# Patient Record
Sex: FEMALE | URBAN - METROPOLITAN AREA
[De-identification: names, ages, dates, MRNs, and addresses within clinical notes are randomized per-mention and may not be internally consistent; named-entity substitution may affect disease eponyms.]

---

## 2019-02-14 ENCOUNTER — HOSPITAL ENCOUNTER (OUTPATIENT)
Dept: NUTRITION | Facility: HOSPITAL | Age: 69
End: 2019-02-16
Attending: INTERNAL MEDICINE | Admitting: INTERNAL MEDICINE

## 2019-02-14 LAB
ABS NEUT (OLG): 6.69 X10(3)/MCL (ref 2.1–9.2)
ALBUMIN SERPL-MCNC: 4.2 GM/DL (ref 3.4–5)
ALBUMIN/GLOB SERPL: 1.2 {RATIO}
ALP SERPL-CCNC: 93 UNIT/L (ref 38–126)
ALT SERPL-CCNC: 18 UNIT/L (ref 12–78)
AST SERPL-CCNC: 16 UNIT/L (ref 15–37)
BASOPHILS # BLD AUTO: 0 X10(3)/MCL (ref 0–0.2)
BASOPHILS NFR BLD AUTO: 1 %
BILIRUB SERPL-MCNC: 1 MG/DL (ref 0.2–1)
BILIRUBIN DIRECT+TOT PNL SERPL-MCNC: 0.1 MG/DL (ref 0–0.2)
BILIRUBIN DIRECT+TOT PNL SERPL-MCNC: 0.9 MG/DL (ref 0–0.8)
BUN SERPL-MCNC: 10 MG/DL (ref 7–18)
CALCIUM SERPL-MCNC: 9.1 MG/DL (ref 8.5–10.1)
CHLORIDE SERPL-SCNC: 103 MMOL/L (ref 98–107)
CO2 SERPL-SCNC: 27 MMOL/L (ref 21–32)
CREAT SERPL-MCNC: 0.88 MG/DL (ref 0.55–1.02)
EOSINOPHIL # BLD AUTO: 0.4 X10(3)/MCL (ref 0–0.9)
EOSINOPHIL NFR BLD AUTO: 5 %
ERYTHROCYTE [DISTWIDTH] IN BLOOD BY AUTOMATED COUNT: 12.8 % (ref 11.5–17)
GLOBULIN SER-MCNC: 3.5 GM/DL (ref 2.4–3.5)
GLUCOSE SERPL-MCNC: 199 MG/DL (ref 74–106)
HCO3 UR-SCNC: 23.5 MMOL/L (ref 22–26)
HCT VFR BLD AUTO: 44.1 % (ref 37–47)
HGB BLD-MCNC: 14.4 GM/DL (ref 12–16)
LYMPHOCYTES # BLD AUTO: 1.4 X10(3)/MCL (ref 0.6–4.6)
LYMPHOCYTES NFR BLD AUTO: 16 %
MCH RBC QN AUTO: 31.9 PG (ref 27–31)
MCHC RBC AUTO-ENTMCNC: 32.7 GM/DL (ref 33–36)
MCV RBC AUTO: 97.6 FL (ref 80–94)
MONOCYTES # BLD AUTO: 0.3 X10(3)/MCL (ref 0.1–1.3)
MONOCYTES NFR BLD AUTO: 3 %
NEUTROPHILS # BLD AUTO: 6.69 X10(3)/MCL (ref 2.1–9.2)
NEUTROPHILS NFR BLD AUTO: 75 %
O2 HGB ARTERIAL: 91 % (ref 94–97)
PCO2 BLDA: 37 MMHG (ref 35–45)
PH SMN: 7.41 [PH] (ref 7.35–7.45)
PLATELET # BLD AUTO: 250 X10(3)/MCL (ref 130–400)
PMV BLD AUTO: 8.6 FL (ref 9.4–12.4)
PO2 BLDA: 58 MMHG (ref 80–100)
POC ALLENS TEST: ABNORMAL
POC BE: -0.8 (ref -2–3)
POC CAO2: 19 ML/DL (ref 15.7–21.6)
POC CO HGB: 1.8 %
POC CO2: 24.6 MMOL/L (ref 22–27)
POC IONIZED CALCIUM: 1.15 MMOL/L (ref 1.12–1.23)
POC MET HGB: 1.2 % (ref 0.4–1.5)
POC SAMPLESOURCE: ABNORMAL
POC SATURATED O2: 90 % (ref 96–97)
POC SITE: ABNORMAL
POC THB: 14.9 GM/DL (ref 12–16)
POC TREATMENT: ABNORMAL
POTASSIUM BLD-SCNC: 2.7 MMOL/L (ref 3.6–5)
POTASSIUM SERPL-SCNC: 3.6 MMOL/L (ref 3.5–5.1)
PROT SERPL-MCNC: 7.7 GM/DL (ref 6.4–8.2)
RBC # BLD AUTO: 4.52 X10(6)/MCL (ref 4.2–5.4)
SODIUM BLD-SCNC: 139 MMOL/L (ref 137–145)
SODIUM SERPL-SCNC: 141 MMOL/L (ref 136–145)
WBC # SPEC AUTO: 8.9 X10(3)/MCL (ref 4.5–11.5)

## 2022-04-30 NOTE — DISCHARGE SUMMARY
Patient:   Marilyn Carpenter            MRN: [id number]            FIN: [id number]               Age:   69 years     Sex:  Female     :  1950   Associated Diagnoses:   None   Author:   Tania Burch MD      Discharge Information      Discharge Summary Information   Admit/Discharge Dates   Admit Date: 2019  Discharge Date: 2019   Physicians   Attending Physician - Conrad LEES, Efrain DUMAS  Admitting Physician - Conrad LEES, Efrain DUMAS  Primary Care Physician - Ken[facility]ONEY, [facility]   Discharge Diagnosis   Acute bronchitis  Dyspnea, unspecified (R06.00)    Procedures   No procedures recorded for this visit.   Immunizations   No immunizations recorded for this visit.   Discharge Medications   Prescribed  albuterol-ipratropium (DuoNeb 0.5 mg-2.5 mg/3 mL inhalation solution) 3 mL, NEB, q4hr Resp, PRN shortness of breath or wheezing  pravastatin (Pravachol 20 mg oral tablet) 20 mg, Oral, Daily  predniSONE (prednisONE 20 mg oral tablet) See Instructions  Continue  levothyroxine (LEVOTHYROXIN TAB 50MCG) 50 mcg, Oral, Daily  Discontinue  albuterol (VENTOLIN HFA AER) 2 puff(s), INH, q4hr, PRN wheezing  azithromycin (AZITHROMYCIN TAB 250MG), Oral, As Directed  meloxicam (MELOXICAM    TAB 7.5MG) 7.5 mg, Oral, Daily  pravastatin (PRAVASTATIN  TAB 20MG) 1 tablet, Oral, Every other day  promethazine (PROMETHAZINE SYP 6.25/5ML)      Education   Discharge - 19 8:13:00 CST, Home       Followup   Anytime the conditions worsen, return to clinic or go to ED  Carrie De NP, within 1 week      Hospital Course   69 yrold WF who presented to the ED with c/o SOB. She developed wheezing about a month ago which has persisted. For the last 3 days she's had progressively worsening SOB. Admits to an occasional cough, clear sputum, but nothing significant. Denies fever or chills. Saw her PCP yesterday and was prescribed zithromax. Chest xray shows nothing acute. ABG showed pH 58. Glucose 199 but otherwise labs  unremarkable. No prior history of COPD or DM. Meds given in ED include Zithromax, solumedrol, Magnesium sulfate and bronchodilators. Feels much better at this time. She quit smoking in 2015 but her  still smokes in the house.  Patient was started on IV Solu-Medrol, Zithromax and DuoNeb's she was feeling much better gradually be weaned her off the steroids.  She was afebrile and symptomatically feeling much better so she was discharged home on prednisone taper.  Patient already had the Zithromax [place] at home and she was advised to continue it for 1 more day.  Patient will follow-up with her primary care physician and she was instructed to follow-up with pulmonologist outpatient.         Physical Examination      Vital Signs (last 24 hrs)_____  Last Charted___________  Temp Oral     36.5 DegC  (FEB 16 07:00)  Heart Rate Peripheral   85 bpm  (FEB 16 07:00)  Resp Rate         20 br/min  (FEB 15 23:33)  SBP      H 143mmHg  (FEB 16 07:00)  DBP      78 mmHg  (FEB 16 07:00)  SpO2      L 93%  (FEB 16 07:00)   General:  Alert and oriented.    Eye:  Pupils are equal, round and reactive to light.    HENT:  Normocephalic.    Neck:  Supple.    Respiratory:  Lungs are clear to auscultation.    Cardiovascular:  Normal rate.    Gastrointestinal:  Soft, Non-tender.    Integumentary:  Warm, Dry.    Neurologic:  Alert, Oriented.    Psychiatric:  Cooperative, Appropriate mood & affect.       Discharge Plan   Discharge Summary Plan   Time spent in discharge was 31 minutes.

## 2022-04-30 NOTE — H&P
Patient:   Marilyn Carpenter            MRN: [id number]            FIN: [id number]               Age:   69 years     Sex:  Female     :  1950   Associated Diagnoses:   None   Author:   Efrain Ralph MD      Basic Information   Time Seen:  Date & Time 2019 07:22:00.    Source of history:  Self.    Present at bedside:  Family member.    Referral source:  Emergency department.    History limitation:  None.    Advance directive:  None.    Provider information/ cc:  Nurse practitioner:  Carrie De NP.       Chief Complaint   SOB      History of Present Illness   Mrs. Carpenter is a 69 yrold WF who presented to the ED with c/o SOB. She developed wheezing about a month ago which has persisted. For the last 3 days she's had progressively worsening SOB. Admits to an occasional cough, clear sputum, but nothing significant. Denies fever or chills. Saw her PCP yesterday and was prescribed zithromax. Chest xray shows nothing acute. ABG showed pH 58. Glucose 199 but otherwise labs unremarkable. No prior history of COPD or DM. Meds given in ED include Zithromax, solumedrol, Magnesium sulfate and bronchodilators. Feels much better at this time. She quit smoking in  but her  still smokes in the house.         Review of Systems   Except as documented, all other systems reviewed and negative      Health Status   Allergies:    Allergic Reactions (Selected)  No Known Allergies   Current medications:  (Selected)   Documented Medications  Documented  AZITHROMYCIN TAB 250MG: Oral, As Directed, started on 19  LEVOTHYROXIN TAB 50MC mcg = 1 tab(s), Oral, Daily  MELOXICAM    TAB 7.5M.5 mg = 1 tab(s), Oral, Daily  PRAVASTATIN  TAB 20M tablet, Oral, Every other day  PROMETHAZINE SYP 6.25/5ML: prescribed 19 unknown dosing  VENTOLIN HFA AER: 2 puff(s), INH, q4hr, PRN PRN wheezing, prescribed 19      Histories     Past Medical History: Hypothyroidism  Past Surgical History: Abdominal  hernia repair, Tonsillectomy  Family History: None  Social History:  No alcohol, tobacco or illicit drug use. Quit smoking in 2015 after smoking 1/2 PPD for > 40 yrs. Her  still smokes in the house.       Physical Examination      Vital Signs (last 24 hrs)_____  Last Charted___________  Temp Oral     36.9 DegC  (FEB 14 04:52)  Heart Rate Peripheral   H 105bpm  (FEB 14 07:14)  Resp Rate         H 27br/min  (FEB 14 07:14)  SBP      114 mmHg  (FEB 14 07:00)  DBP      64 mmHg  (FEB 14 07:00)  SpO2      96 %  (FEB 14 07:14)  Weight      59 kg  (FEB 14 06:45)  Height      154 cm  (FEB 14 06:45)  BMI      24.88  (FEB 14 06:45)   General:  Alert and oriented, No acute distress.    Cognition and Speech:  Oriented, Speech clear and coherent.    HENT:  Normocephalic, Normal hearing, Oral mucosa is moist.    Eye:  Pupils are equal, round and reactive to light, Normal conjunctiva.    Neck:  Supple, No carotid bruit, No jugular venous distention.    Respiratory:  Respirations are non-labored, Breath sounds are equal, few scattered rales but no wheezing.    Cardiovascular:  Normal rate, Regular rhythm, No murmur, No edema.    Gastrointestinal:  Soft, Non-tender, Non-distended, Normal bowel sounds.    Integumentary:  Warm, Dry, Intact.    Musculoskeletal:  Normal strength, No tenderness, No swelling.    Neurologic:  Alert, Oriented, Normal sensory, No focal deficits.    Psychiatric:  Cooperative, Appropriate mood & affect, Normal judgment.       Review / Management   Laboratory Results   Today's Lab Results : PowerNote Discrete Results   2/14/2019 6:38 CST       Sample ABG                art                             Treatment                 RA                             Site                      Radial Rt                             pH Art                    7.410                             pCO2 Art                  37.0 mmHg                             pO2 Art                   58.0 mmHg  LOW                              HCO3 Art                  23.5 mmol/L                             CO2 Totl Art              24.6 mmol/L                             O2 Sat Art                90.0 %  LOW                             D base                    -0.8                             THB ABG                   14.9 gm/dL                             CO Hgb                    1.8 %  NA    2/14/2019 6:30 CST       WBC                       8.9 x10(3)/mcL                             Hgb                       14.4 gm/dL                             Hct                       44.1 %                             Platelet                  250 x10(3)/mcL                             MCV                       97.6 fL  HI                             MCH                       31.9 pg  HI                             Sodium Lvl                141 mmol/L                             Potassium Lvl             3.6 mmol/L                             Chloride                  103 mmol/L                             CO2                       27.0 mmol/L                             Calcium Lvl               9.1 mg/dL                             Glucose Lvl               199 mg/dL  HI                             BUN                       10.0 mg/dL                             Creatinine                0.88 mg/dL                             Bili Total                1.0 mg/dL                             Bili Direct               0.10 mg/dL                             Bili Indirect             0.90 mg/dL  HI                             AST                       16 unit/L                             ALT                       18 unit/L                             Albumin Lvl               4.20 gm/dL        * Final Report *    Reason For Exam  Shortness of Breath    Radiology Report  PORTABLE CHEST X-RAY, ONE FRONTAL VIEW     HISTORY: Shortness of Breath     COMPARISON: None.     FINDINGS:       No focal consolidations, pleural effusions or pneumothoraces.  Cardiomediastinal  silhouette within normal limits.  No acute bony pathology.  Soft tissues within normal limits.       IMPRESSION:     No acute thoracic abnormality.            Signature Line  Electronically Signed By: Manda LEES, Brook Leon  Date/Time Signed: 02/14/2019 07:08         Impression and Plan     Acute bronchitis  Hypoxemia  Possible COPD   Hypothyroidism    PLAN:  - Continue Zithromax, Duonebs and Solumedrol.   - O2 PRN to keep sats > 92%  - Robitussin PRN  - Consider pulmonary consult versus outpatient follow up    I, Tiffany Encinas NP, discussed this case with Dr. JACKIE Ralph.       Professional Services   I Dr. Ralph have assumed care of this patient today.  I had a face to face time with this patient and have reviewed the NP documentation, medical decision making and treatment plan.  A.  History:  70yo female with worsening SOB over the last few days. She does have h/o COPD and is exposed to smoke daily.  She stopped smoking a couple of years years ago.  B.  Physical Exam:  A&O,  A&O, RRR,  few scattered wheezes, NTTP  C.  Plan:  IV steroids, IV antibiotics, and duonebs.  will follow and if she continues to improve then possible d/c in 1-2 days

## 2022-04-30 NOTE — ED PROVIDER NOTES
Patient:   Marilyn Carpenter            MRN: [id number]            FIN: [id number]               Age:   69 years     Sex:  Female     :  1950   Associated Diagnoses:   Acute dyspnea; COPD exacerbation   Author:   Hector LEES, Abrahan CALVERT      Basic Information   Time seen: Date & time 2019 05:00:00.   History source: Patient.   Arrival mode: Ambulance.   History limitation: None.   Additional information: Chief Complaint from Nursing Triage Note : Chief Complaint   2019 4:52 CST       Chief Complaint           states w/ sob for a few days worsening tonight, denies hx copd, tight cough noted, occasional grunting, 1x duoneb per aasi, no steroids, audible wheezing  .      History of Present Illness   The patient presents with   69 year old W female presents to ED via EMS with SOB x 2 days. pt reports that she went to the doctor yesterday where she was given a breathing treatment and put on antibiotics. EMS also gave her another breathing treatment today in addition to one she took at home PTA. Her  does smoke in their house. .  The onset was 2  days ago.  The course/duration of symptoms is constant.  Degree at onset moderate.  Degree at present moderate.  The Exacerbating factors is smoke exposure.  The Relieving factors is oxygen.  Risk factors consist of smoke exposure.  Prior episodes: occasional.  Therapy today: beta-agonist 3  treatments in 24 hours and emergency medical services.  Associated symptoms: none.        Review of Systems   Constitutional symptoms:  Negative except as documented in HPI.   Skin symptoms:  Negative except as documented in HPI.   Eye symptoms:  Negative except as documented in HPI.   ENMT symptoms:  Negative except as documented in HPI.   Respiratory symptoms:  Shortness of breath.   Cardiovascular symptoms:  Negative except as documented in HPI.   Gastrointestinal symptoms:  Negative except as documented in HPI.   Genitourinary symptoms:  Negative except as  documented in HPI.   Musculoskeletal symptoms:  Negative except as documented in HPI.   Neurologic symptoms:  Negative except as documented in HPI.      Health Status   Allergies: No known allergies.   Medications:  (Selected)   Documented Medications  Documented  AZITHROMYCIN TAB 250MG: Oral, As Directed  LEVOTHYROXIN TAB 50MC mcg = 1 tab(s), Oral, Daily  MELOXICAM    TAB 7.5M.5 mg = 1 tab(s), Oral, Daily  PRAVASTATIN  TAB 20MG:   PROMETHAZINE SYP 6.25/5ML:   VENTOLIN HFA AER: .      Past Medical/ Family/ Social History   Medical history:    No active or resolved past medical history items have been selected or recorded..   Surgical history:    No active procedure history items have been selected or recorded..   Family history:    No family history items have been selected or recorded..   Social history: Tobacco use: Quit 4 years ago.      Physical Examination               Vital Signs   Vital Signs   2019 5:00 CST       Peripheral Pulse Rate     104 bpm  HI                             Heart Rate Monitored      104 bpm  HI                             Respiratory Rate          21 br/min                             SpO2                      97 %                             Oxygen Therapy            Room air                             Systolic Blood Pressure   141 mmHg  HI                             Diastolic Blood Pressure  81 mmHg                             Mean Arterial Pressure, Cuff              101 mmHg    2019 4:52 CST       Temperature Oral          36.9 DegC                             Temperature Oral (calculated)             98.42 DegF                             Peripheral Pulse Rate     106 bpm  HI                             Respiratory Rate          28 br/min  HI                             SpO2                      94 %                             Oxygen Therapy            Room air                             Systolic Blood Pressure   134 mmHg                             Diastolic  Blood Pressure  82 mmHg  .      Vital Signs (last 24 hrs)_____  Last Charted___________  Temp Oral     36.9 DegC  (FEB 14 04:52)  Heart Rate Peripheral   H 104bpm  (FEB 14 05:00)  Resp Rate         21 br/min  (FEB 14 05:00)  SBP      H 141mmHg  (FEB 14 05:00)  DBP      81 mmHg  (FEB 14 05:00)  SpO2      97 %  (FEB 14 05:00).   Measurements   2/14/2019 4:52 CST       Weight Dosing             59 kg                             Weight Measured and Calculated in Lbs     130.07 lb                             Weight Estimated          59 kg                             Height/Length Dosing      154 cm                             Height/Length Estimated   154 cm                             Body Mass Index Estimated 24.88 kg/m2  .   Basic Oxygen Information   2/14/2019 5:00 CST       SpO2                      97 %                             Oxygen Therapy            Room air    2/14/2019 4:52 CST       SpO2                      94 %                             Oxygen Therapy            Room air  .   General:  Alert, no acute distress.    Skin:  Warm, dry, intact.    Head:  Normocephalic, atraumatic.    Eye   Cardiovascular:  Regular rate and rhythm, Normal peripheral perfusion, No edema.    Respiratory:  Respirations are non-labored, breath sounds are equal, Symmetrical chest wall expansion, Breath sounds: Diminished, wheezes present (expiratory wheezes, diffuse).    Gastrointestinal:  Soft, Nontender, Non distended, Normal bowel sounds, Guarding: Negative, Rebound: Negative.    Musculoskeletal:  No deformity.   Neurological:  Alert and oriented to person, place, time, and situation, No focal neurological deficit observed, normal speech observed.    Psychiatric:  Cooperative, appropriate mood & affect.       Medical Decision Making   Documents reviewed:  Emergency department nurses' notes.   Orders  Launch Order Profile (Selected)   Inpatient Orders  Ordered  Aerosol Treatment: 02/14/19 5:04:00 CST, 02/14/19 5:04:00 CST,  [id number]  Aerosol Treatment: 02/14/19 5:04:00 CST, 02/14/19 5:04:00 CST, [id number]  Magnesium Sulfate 2gm/50ml IVPB (Premix): 2 gm, form: Infusion, IV Piggyback, Once, Infuse over: 20 minute(s), first dose 02/14/19 5:05:00 CST, stop date 02/14/19 5:05:00 CST, STAT  Solumedrol IV push / IM: 125 mg, form: Injection, IV Push, Once, first dose 02/14/19 5:05:00 CST, stop date 02/14/19 5:05:00 CST, STAT  albuterol 2.5 mg/3 mL (0.083%) inhalation solution: 7.5 mg, form: Soln-Inh, NEB, Once, first dose 02/14/19 5:04:00 CST, stop date 02/14/19 5:04:00 CST, STAT, Continuous Inhalation Therapy  ipratropium neb 0.02% UD: 2.5 mL, 500 mcg =, form: Soln-Inh, NEB, Once, first dose 02/14/19 5:04:00 CST, stop date 02/14/19 5:04:00 CST, STAT  Ordered (Exam Ordered)  CXR 1 View: Stat, 02/14/19 5:04:00 CST, Shortness of Breath, None, Stretcher, Rad Type, Not Scheduled, 02/14/19 5:04:00 CST  Completed  magnesium sulfate INJ: 2 gm, 50 mL, Infusion, N/A, Once, Stop date 02/14/19 5:05:33 CST, Physician Stop, 02/14/19 5:05:33 CST  methylPREDNISolone: 125 mg, 2 mL, Injection, N/A, Once, Stop date 02/14/19 5:05:18 CST, Physician Stop, 02/14/19 5:05:18 CST.   Electrocardiogram:  Time 2/14/2019 04:53:00, rate 104, No ST-T changes, normal WI & QRS intervals, EP Interp, The Rhythm is sinus tachycardia.  , Ectopy Occasional, premature ventricular contractions.    Results review:      Reexamination/ Reevaluation   Vital signs   Basic Oxygen Information   2/14/2019 5:00 CST       SpO2                      97 %                             Oxygen Therapy            Room air    2/14/2019 4:52 CST       SpO2                      94 %                             Oxygen Therapy            Room air     Assessment: Breathing has improved after nebulizer Solu-Medrol and magnesium however she still has grossly diminished breath sounds and prolonged respiratory wheezing she does not feel at baseline she does not feel comfortable going home with her level  of respiratory discomfort.      Impression and Plan   Diagnosis   Acute dyspnea (PPI95-ON R06.00)   COPD exacerbation (PIJ63-JB J44.1)      Calls-Consults   -  Flor Murri[place].   Plan   Condition: Improved, Stable.    Disposition: Place in Observation Unit.    Counseled: Patient, Regarding diagnosis, Regarding diagnostic results, Regarding treatment plan, Patient indicated understanding of instructions.    Notes: I, Sarahi Naranjo, acted solely as a scribe for and in the presence of Dr. Young who performed the service., I, Dr Young have read note from scribe and I agree with history and physical except as amended by me.  All information was dictated from my history and my examination of patient..